# Patient Record
Sex: FEMALE | Race: WHITE | Employment: STUDENT | URBAN - METROPOLITAN AREA
[De-identification: names, ages, dates, MRNs, and addresses within clinical notes are randomized per-mention and may not be internally consistent; named-entity substitution may affect disease eponyms.]

---

## 2018-11-14 PROCEDURE — 86376 MICROSOMAL ANTIBODY EACH: CPT | Performed by: FAMILY MEDICINE

## 2018-11-14 PROCEDURE — 86800 THYROGLOBULIN ANTIBODY: CPT | Performed by: FAMILY MEDICINE

## 2022-04-20 ENCOUNTER — ORDER TRANSCRIPTION (OUTPATIENT)
Dept: ADMINISTRATIVE | Facility: HOSPITAL | Age: 27
End: 2022-04-20

## 2022-04-27 ENCOUNTER — LAB ENCOUNTER (OUTPATIENT)
Dept: LAB | Facility: HOSPITAL | Age: 27
End: 2022-04-27
Attending: INTERNAL MEDICINE
Payer: COMMERCIAL

## 2022-04-27 DIAGNOSIS — Z01.818 PREOP EXAMINATION: ICD-10-CM

## 2022-04-27 LAB — SARS-COV-2 RNA RESP QL NAA+PROBE: NOT DETECTED

## 2022-04-30 ENCOUNTER — HOSPITAL ENCOUNTER (OUTPATIENT)
Dept: GENERAL RADIOLOGY | Facility: HOSPITAL | Age: 27
Discharge: HOME | End: 2022-04-30
Attending: INTERNAL MEDICINE
Payer: COMMERCIAL

## 2022-04-30 DIAGNOSIS — R13.19 ESOPHAGEAL DYSPHAGIA: ICD-10-CM

## 2022-04-30 PROCEDURE — 74221 X-RAY XM ESOPHAGUS 2CNTRST: CPT | Performed by: INTERNAL MEDICINE

## 2022-12-05 ENCOUNTER — HOSPITAL ENCOUNTER (OUTPATIENT)
Facility: HOSPITAL | Age: 27
Setting detail: OBSERVATION
Discharge: HOME | End: 2022-12-07
Attending: EMERGENCY MEDICINE | Admitting: INTERNAL MEDICINE
Payer: COMMERCIAL

## 2022-12-05 ENCOUNTER — APPOINTMENT (OUTPATIENT)
Dept: CT IMAGING | Facility: HOSPITAL | Age: 27
End: 2022-12-05
Attending: INTERNAL MEDICINE
Payer: COMMERCIAL

## 2022-12-05 DIAGNOSIS — R00.0 TACHYCARDIA: Primary | ICD-10-CM

## 2022-12-05 PROBLEM — I47.10 SUSTAINED SVT: Status: ACTIVE | Noted: 2022-12-05

## 2022-12-05 PROBLEM — I47.10 SUSTAINED SVT (HCC): Status: ACTIVE | Noted: 2022-12-05

## 2022-12-05 PROBLEM — I47.1 SUSTAINED SVT (HCC): Status: ACTIVE | Noted: 2022-12-05

## 2022-12-05 LAB
ATRIAL RATE: 136 BPM
ATRIAL RATE: 141 BPM
B-HCG UR QL: NEGATIVE
D DIMER PPP FEU-MCNC: 0.28 UG/ML FEU (ref ?–0.5)
HETEROPH AB SER QL: NEGATIVE
P AXIS: 53 DEGREES
P-R INTERVAL: 104 MS
P-R INTERVAL: 134 MS
PROCALCITONIN SERPL-MCNC: 0.07 NG/ML (ref ?–0.16)
Q-T INTERVAL: 292 MS
Q-T INTERVAL: 360 MS
QRS DURATION: 68 MS
QRS DURATION: 70 MS
QTC CALCULATION (BEZET): 439 MS
QTC CALCULATION (BEZET): 551 MS
R AXIS: 79 DEGREES
R AXIS: 80 DEGREES
SARS-COV-2 RNA RESP QL NAA+PROBE: NOT DETECTED
T AXIS: -55 DEGREES
T AXIS: 22 DEGREES
TSI SER-ACNC: 0.55 MIU/ML (ref 0.36–3.74)
VENTRICULAR RATE: 136 BPM
VENTRICULAR RATE: 141 BPM

## 2022-12-05 PROCEDURE — 84145 PROCALCITONIN (PCT): CPT | Performed by: INTERNAL MEDICINE

## 2022-12-05 PROCEDURE — 85379 FIBRIN DEGRADATION QUANT: CPT | Performed by: EMERGENCY MEDICINE

## 2022-12-05 PROCEDURE — 70450 CT HEAD/BRAIN W/O DYE: CPT | Performed by: INTERNAL MEDICINE

## 2022-12-05 PROCEDURE — 93005 ELECTROCARDIOGRAM TRACING: CPT

## 2022-12-05 PROCEDURE — 99285 EMERGENCY DEPT VISIT HI MDM: CPT

## 2022-12-05 PROCEDURE — 99291 CRITICAL CARE FIRST HOUR: CPT

## 2022-12-05 PROCEDURE — 86403 PARTICLE AGGLUT ANTBDY SCRN: CPT | Performed by: EMERGENCY MEDICINE

## 2022-12-05 PROCEDURE — 93010 ELECTROCARDIOGRAM REPORT: CPT

## 2022-12-05 PROCEDURE — 96361 HYDRATE IV INFUSION ADD-ON: CPT

## 2022-12-05 PROCEDURE — 96374 THER/PROPH/DIAG INJ IV PUSH: CPT

## 2022-12-05 PROCEDURE — 93010 ELECTROCARDIOGRAM REPORT: CPT | Performed by: INTERNAL MEDICINE

## 2022-12-05 PROCEDURE — 81025 URINE PREGNANCY TEST: CPT

## 2022-12-05 PROCEDURE — 96375 TX/PRO/DX INJ NEW DRUG ADDON: CPT

## 2022-12-05 PROCEDURE — 70491 CT SOFT TISSUE NECK W/DYE: CPT | Performed by: INTERNAL MEDICINE

## 2022-12-05 PROCEDURE — 84443 ASSAY THYROID STIM HORMONE: CPT | Performed by: EMERGENCY MEDICINE

## 2022-12-05 RX ORDER — ADENOSINE 3 MG/ML
6 INJECTION, SOLUTION INTRAVENOUS ONCE
Status: COMPLETED | OUTPATIENT
Start: 2022-12-05 | End: 2022-12-05

## 2022-12-05 RX ORDER — ONDANSETRON 2 MG/ML
4 INJECTION INTRAMUSCULAR; INTRAVENOUS EVERY 6 HOURS PRN
Status: DISCONTINUED | OUTPATIENT
Start: 2022-12-05 | End: 2022-12-07

## 2022-12-05 RX ORDER — ADENOSINE 3 MG/ML
INJECTION, SOLUTION INTRAVENOUS
Status: COMPLETED
Start: 2022-12-05 | End: 2022-12-05

## 2022-12-05 RX ORDER — ADENOSINE 3 MG/ML
12 INJECTION INTRAVENOUS ONCE
Status: COMPLETED | OUTPATIENT
Start: 2022-12-05 | End: 2022-12-05

## 2022-12-05 RX ORDER — METOPROLOL TARTRATE 5 MG/5ML
5 INJECTION INTRAVENOUS ONCE
Status: COMPLETED | OUTPATIENT
Start: 2022-12-05 | End: 2022-12-05

## 2022-12-05 RX ORDER — ENOXAPARIN SODIUM 100 MG/ML
40 INJECTION SUBCUTANEOUS DAILY
Status: DISCONTINUED | OUTPATIENT
Start: 2022-12-06 | End: 2022-12-07

## 2022-12-05 RX ORDER — ACETAMINOPHEN 500 MG
500 TABLET ORAL EVERY 4 HOURS PRN
Status: DISCONTINUED | OUTPATIENT
Start: 2022-12-05 | End: 2022-12-07

## 2022-12-05 RX ORDER — SODIUM CHLORIDE 9 MG/ML
INJECTION, SOLUTION INTRAVENOUS CONTINUOUS
Status: ACTIVE | OUTPATIENT
Start: 2022-12-05 | End: 2022-12-05

## 2022-12-05 RX ORDER — SODIUM CHLORIDE 9 MG/ML
INJECTION, SOLUTION INTRAVENOUS CONTINUOUS
Status: DISCONTINUED | OUTPATIENT
Start: 2022-12-05 | End: 2022-12-06

## 2022-12-05 RX ORDER — SODIUM CHLORIDE 9 MG/ML
INJECTION, SOLUTION INTRAVENOUS ONCE
Status: COMPLETED | OUTPATIENT
Start: 2022-12-05 | End: 2022-12-05

## 2022-12-05 RX ORDER — ACETAMINOPHEN 500 MG
1000 TABLET ORAL EVERY 6 HOURS PRN
COMMUNITY

## 2022-12-05 RX ORDER — IOHEXOL 350 MG/ML
50 INJECTION, SOLUTION INTRAVENOUS
Status: COMPLETED | OUTPATIENT
Start: 2022-12-05 | End: 2022-12-05

## 2022-12-05 RX ORDER — PROCHLORPERAZINE EDISYLATE 5 MG/ML
5 INJECTION INTRAMUSCULAR; INTRAVENOUS EVERY 8 HOURS PRN
Status: DISCONTINUED | OUTPATIENT
Start: 2022-12-05 | End: 2022-12-07

## 2022-12-05 NOTE — ED QUICK NOTES
Orders for admission, patient is aware of plan and ready to go upstairs. Any questions, please call ED RN Clare Shields  at extension [phone]. Vaccinated? No   Type of COVID test sent: Rapid PCR, negative. COVID Suspicion level: Low      Titratable drug(s) infusing:  Rate: None. LOC at time of transport: A&Ox4    Other pertinent information: Received adenosine x2, HR did not resolve. 5mg metoprolol given, -120 now. Preg -.     CIWA score=  NIH score=

## 2022-12-05 NOTE — ED INITIAL ASSESSMENT (HPI)
Pt here from [address], going there for palpitations, sorethroat and fever, pt had full workup there including Iv fluids, temp is down but hr still elev

## 2022-12-06 LAB
ANION GAP SERPL CALC-SCNC: 6 MMOL/L (ref 0–18)
BASOPHILS # BLD AUTO: 0.06 X10(3) UL (ref 0–0.2)
BASOPHILS NFR BLD AUTO: 0.5 %
BUN BLD-MCNC: 7 MG/DL (ref 7–18)
CALCIUM BLD-MCNC: 8.8 MG/DL (ref 8.5–10.1)
CHLORIDE SERPL-SCNC: 109 MMOL/L (ref 98–112)
CO2 SERPL-SCNC: 26 MMOL/L (ref 21–32)
CREAT BLD-MCNC: 0.68 MG/DL
EOSINOPHIL # BLD AUTO: 0.1 X10(3) UL (ref 0–0.7)
EOSINOPHIL NFR BLD AUTO: 0.8 %
ERYTHROCYTE [DISTWIDTH] IN BLOOD BY AUTOMATED COUNT: 12.2 %
GFR SERPLBLD BASED ON 1.73 SQ M-ARVRAT: 122 ML/MIN/1.73M2 (ref 60–?)
GLUCOSE BLD-MCNC: 94 MG/DL (ref 70–99)
HCT VFR BLD AUTO: 41.4 %
HGB BLD-MCNC: 13.7 G/DL
IMM GRANULOCYTES # BLD AUTO: 0.05 X10(3) UL (ref 0–1)
IMM GRANULOCYTES NFR BLD: 0.4 %
LYMPHOCYTES # BLD AUTO: 1.21 X10(3) UL (ref 1–4)
LYMPHOCYTES NFR BLD AUTO: 9.5 %
MAGNESIUM SERPL-MCNC: 2.2 MG/DL (ref 1.6–2.6)
MCH RBC QN AUTO: 28.4 PG (ref 26–34)
MCHC RBC AUTO-ENTMCNC: 33.1 G/DL (ref 31–37)
MCV RBC AUTO: 85.9 FL
MONOCYTES # BLD AUTO: 1.09 X10(3) UL (ref 0.1–1)
MONOCYTES NFR BLD AUTO: 8.6 %
NEUTROPHILS # BLD AUTO: 10.19 X10 (3) UL (ref 1.5–7.7)
NEUTROPHILS # BLD AUTO: 10.19 X10(3) UL (ref 1.5–7.7)
NEUTROPHILS NFR BLD AUTO: 80.2 %
OSMOLALITY SERPL CALC.SUM OF ELEC: 290 MOSM/KG (ref 275–295)
PHOSPHATE SERPL-MCNC: 2.2 MG/DL (ref 2.5–4.9)
PLATELET # BLD AUTO: 222 10(3)UL (ref 150–450)
POTASSIUM SERPL-SCNC: 3.7 MMOL/L (ref 3.5–5.1)
RBC # BLD AUTO: 4.82 X10(6)UL
SODIUM SERPL-SCNC: 141 MMOL/L (ref 136–145)
WBC # BLD AUTO: 12.7 X10(3) UL (ref 4–11)

## 2022-12-06 PROCEDURE — 85025 COMPLETE CBC W/AUTO DIFF WBC: CPT | Performed by: INTERNAL MEDICINE

## 2022-12-06 PROCEDURE — 84100 ASSAY OF PHOSPHORUS: CPT | Performed by: INTERNAL MEDICINE

## 2022-12-06 PROCEDURE — 80048 BASIC METABOLIC PNL TOTAL CA: CPT | Performed by: INTERNAL MEDICINE

## 2022-12-06 PROCEDURE — 93010 ELECTROCARDIOGRAM REPORT: CPT | Performed by: INTERNAL MEDICINE

## 2022-12-06 PROCEDURE — 93005 ELECTROCARDIOGRAM TRACING: CPT

## 2022-12-06 PROCEDURE — 83735 ASSAY OF MAGNESIUM: CPT | Performed by: INTERNAL MEDICINE

## 2022-12-06 RX ORDER — BENZOCAINE AND MENTHOL, UNSPECIFIED FORM 15; 2.3 MG/1; MG/1
1 LOZENGE ORAL EVERY 2 HOUR PRN
Qty: 16 LOZENGE | Refills: 1 | Status: SHIPPED | OUTPATIENT
Start: 2022-12-06

## 2022-12-06 RX ORDER — ALBUTEROL SULFATE 90 UG/1
2 AEROSOL, METERED RESPIRATORY (INHALATION) 4 TIMES DAILY
Status: DISCONTINUED | OUTPATIENT
Start: 2022-12-06 | End: 2022-12-06

## 2022-12-06 NOTE — PLAN OF CARE
Pt AOx4   Complains of throat and headache   -tylenol given   - rest encouraged   NSR/ ST(exertion)   - cardiologist started pt on betablocker   RA  IV fluids   -discontinued    EKG    -attempted to get while pt was standing but was unable.   -tele tech able to print strips with heart rate above 130. Strips are in pt's chart  Discharge planning   All of pt's question answered.

## 2022-12-06 NOTE — PLAN OF CARE
NURSING ADMISSION NOTE    Received care @ [phone]  A&Ox4; RA  ST on tele  IV fluids infusing per order  POC discussed w/ patient       Patient admitted via Cart  Oriented to room. Safety precautions initiated. Bed in low position. Call light in reach.

## 2022-12-06 NOTE — PLAN OF CARE
Assumed care at 1. Aox4. RA. VSS. IVF running. C/O headache. x1 Tylenol given. CT of head and neck completed. Safety precautions in place. Bed lowered and locked. Pt resting in bed. Call light in reach. All questions and concerns addressed. Continue with current POC.

## 2022-12-07 VITALS
SYSTOLIC BLOOD PRESSURE: 122 MMHG | BODY MASS INDEX: 25 KG/M2 | DIASTOLIC BLOOD PRESSURE: 86 MMHG | OXYGEN SATURATION: 96 % | WEIGHT: 175 LBS | HEART RATE: 79 BPM | TEMPERATURE: 98 F | RESPIRATION RATE: 18 BRPM

## 2022-12-07 LAB
PHOSPHATE SERPL-MCNC: 2.5 MG/DL (ref 2.5–4.9)
POTASSIUM SERPL-SCNC: 3.9 MMOL/L (ref 3.5–5.1)

## 2022-12-07 PROCEDURE — 84100 ASSAY OF PHOSPHORUS: CPT | Performed by: HOSPITALIST

## 2022-12-07 PROCEDURE — 84132 ASSAY OF SERUM POTASSIUM: CPT | Performed by: HOSPITALIST

## 2022-12-07 NOTE — PLAN OF CARE
Pt Aox4  NSR/ST  RA  Elyte replacement  Discharge planning  -Went over the importance of following up with primary and cardiology. Went over the new medication metoprolol with pt. Cardiologist gave specific instruction: not having coffee or alcohol for 2 weeks, buy pulse ox ans check twice a day if over 130 constantly call doctor office. All of pt's question answered.

## 2022-12-07 NOTE — PROGRESS NOTES
Pt c/o chest pressure/pain, 6/10, started this afternoon, roughly 30 minutes after PO Lopressor (given at 1558). Pt reported during shift change. Dr. Usha Galloway, cardiology, notified, obtained EKG per order. Dr. Usha Galloway to review results. No additional orders at this time. Will continue to monitor for now. Addendum  No changes in EKG per Dr. Usha Galloway. Pt has been having sore throat and coughing for last few days per pt. Will try to manage coughing and sore throat with PRN cough drops. Will continue to monitor.

## 2022-12-07 NOTE — PROGRESS NOTES
Received pt at 2330. Pt is A&O x 4; follows commands. Pt denies CP or SOB. Pt is on room air with O2 saturation at 98, and SR/ST (with activity) on tele. Cardiac diet, thin liquids. Pt is continent of bowel and bladder. Pt denies pain and ambulates ad bella. POC discussed with pt who verbalized understanding. [address] [facility] safety plan in place; bed in low position,, call light and personal items within reach; pt has agreed to call if she needs to get up from bed.

## 2022-12-07 NOTE — DISCHARGE INSTRUCTIONS
Intrstucted to avoid strenuous activity, alcohol and caffeine for the next 10-14 days.  She will purchase a pulse ox an record sitting and standing heart rates and inform us if they are persistently >130-140

## 2022-12-08 LAB
ATRIAL RATE: 112 BPM
ATRIAL RATE: 97 BPM
P AXIS: 40 DEGREES
P AXIS: 56 DEGREES
P-R INTERVAL: 118 MS
P-R INTERVAL: 148 MS
Q-T INTERVAL: 344 MS
Q-T INTERVAL: 358 MS
QRS DURATION: 72 MS
QRS DURATION: 78 MS
QTC CALCULATION (BEZET): 454 MS
QTC CALCULATION (BEZET): 469 MS
R AXIS: 64 DEGREES
R AXIS: 77 DEGREES
T AXIS: -1 DEGREES
T AXIS: 4 DEGREES
VENTRICULAR RATE: 112 BPM
VENTRICULAR RATE: 97 BPM

## 2023-05-24 ENCOUNTER — APPOINTMENT (OUTPATIENT)
Dept: CARDIOLOGY | Age: 28
End: 2023-05-24

## 2023-07-07 ENCOUNTER — APPOINTMENT (OUTPATIENT)
Dept: CARDIOLOGY | Age: 28
End: 2023-07-07

## 2023-09-05 DIAGNOSIS — R06.09 DOE (DYSPNEA ON EXERTION): ICD-10-CM

## 2023-09-05 DIAGNOSIS — R00.0 TACHYCARDIA: ICD-10-CM

## 2023-09-05 DIAGNOSIS — R07.9 CHEST PAIN: Primary | ICD-10-CM

## 2023-09-05 DIAGNOSIS — R00.2 PALPITATIONS: ICD-10-CM

## 2023-10-04 ENCOUNTER — APPOINTMENT (OUTPATIENT)
Dept: CARDIOLOGY | Age: 28
End: 2023-10-04

## 2023-10-10 NOTE — IMAGING NOTE
Spoke with Vinay Garcia RN from [facility] to clarify order; CT heart with contrast only on 10/11/23 no gated study needed at this time

## 2023-10-11 ENCOUNTER — HOSPITAL ENCOUNTER (OUTPATIENT)
Dept: CT IMAGING | Facility: HOSPITAL | Age: 28
Discharge: HOME | End: 2023-10-11
Payer: COMMERCIAL

## 2023-10-11 VITALS — DIASTOLIC BLOOD PRESSURE: 63 MMHG | HEART RATE: 46 BPM | SYSTOLIC BLOOD PRESSURE: 98 MMHG

## 2023-10-11 DIAGNOSIS — R07.9 CHEST PAIN, UNSPECIFIED: ICD-10-CM

## 2023-10-11 DIAGNOSIS — R00.2 PALPITATIONS: ICD-10-CM

## 2023-10-11 DIAGNOSIS — I47.10 SUPRAVENTRICULAR TACHYCARDIA: ICD-10-CM

## 2023-10-11 LAB
CREAT BLD-MCNC: 0.7 MG/DL
EGFRCR SERPLBLD CKD-EPI 2021: 121 ML/MIN/1.73M2 (ref 60–?)

## 2023-10-11 PROCEDURE — 75574 CT ANGIO HRT W/3D IMAGE: CPT | Performed by: INTERNAL MEDICINE

## 2023-10-11 PROCEDURE — 75574 CT ANGIO HRT W/3D IMAGE: CPT

## 2023-10-11 PROCEDURE — 82565 ASSAY OF CREATININE: CPT

## 2023-10-11 NOTE — IMAGING NOTE
Pt scheduled for CT gated pulmonary vein  Denies any allergies to contrast.  Scanned in CT room # 3  HR 63 during contrast bolus  HR 46 during scan at 0842  0.9 NS 50/50 ml given  IV contrast 20/90 ml given with a total of 110 ml. Tolerated exam well. Instructed to drink water.

## 2023-12-16 ENCOUNTER — HOSPITAL ENCOUNTER (OUTPATIENT)
Dept: CT IMAGING | Facility: HOSPITAL | Age: 28
Discharge: HOME | End: 2023-12-16
Attending: INTERNAL MEDICINE
Payer: COMMERCIAL

## 2023-12-16 DIAGNOSIS — I47.10 SVT (SUPRAVENTRICULAR TACHYCARDIA): ICD-10-CM

## 2023-12-16 DIAGNOSIS — R00.0 TACHYCARDIA: ICD-10-CM

## 2023-12-16 PROCEDURE — 71250 CT THORAX DX C-: CPT | Performed by: INTERNAL MEDICINE

## 2024-03-27 ENCOUNTER — LAB ENCOUNTER (OUTPATIENT)
Dept: LAB | Age: 29
End: 2024-03-27
Attending: INTERNAL MEDICINE
Payer: COMMERCIAL

## 2024-03-27 DIAGNOSIS — R00.2 PALPITATIONS: ICD-10-CM

## 2024-03-27 DIAGNOSIS — R07.9 CHEST PAIN: ICD-10-CM

## 2024-03-27 DIAGNOSIS — I47.10 SUPRAVENTRICULAR TACHYCARDIA (HCC): ICD-10-CM

## 2024-03-27 DIAGNOSIS — R00.0 TACHYCARDIA: ICD-10-CM

## 2024-03-27 DIAGNOSIS — R06.09 DOE (DYSPNEA ON EXERTION): Primary | ICD-10-CM

## 2024-03-27 DIAGNOSIS — Z01.818 PRE-OP EXAM: ICD-10-CM

## 2024-03-27 LAB
ANION GAP SERPL CALC-SCNC: 13 MMOL/L (ref 0–18)
BASOPHILS # BLD AUTO: 0.07 X10(3) UL (ref 0–0.2)
BASOPHILS NFR BLD AUTO: 1.5 %
BUN BLD-MCNC: 5 MG/DL (ref 9–23)
CALCIUM BLD-MCNC: 9.9 MG/DL (ref 8.5–10.1)
CHLORIDE SERPL-SCNC: 100 MMOL/L (ref 98–112)
CO2 SERPL-SCNC: 20 MMOL/L (ref 21–32)
CREAT BLD-MCNC: 0.98 MG/DL
EGFRCR SERPLBLD CKD-EPI 2021: 81 ML/MIN/1.73M2 (ref 60–?)
EOSINOPHIL # BLD AUTO: 0.1 X10(3) UL (ref 0–0.7)
EOSINOPHIL NFR BLD AUTO: 2.1 %
ERYTHROCYTE [DISTWIDTH] IN BLOOD BY AUTOMATED COUNT: 12.3 %
FASTING STATUS PATIENT QL REPORTED: YES
GLUCOSE BLD-MCNC: 75 MG/DL (ref 70–99)
HCT VFR BLD AUTO: 46.7 %
HGB BLD-MCNC: 16.2 G/DL
IMM GRANULOCYTES # BLD AUTO: 0.01 X10(3) UL (ref 0–1)
IMM GRANULOCYTES NFR BLD: 0.2 %
LYMPHOCYTES # BLD AUTO: 1.32 X10(3) UL (ref 1–4)
LYMPHOCYTES NFR BLD AUTO: 27.9 %
MCH RBC QN AUTO: 28.2 PG (ref 26–34)
MCHC RBC AUTO-ENTMCNC: 34.7 G/DL (ref 31–37)
MCV RBC AUTO: 81.2 FL
MONOCYTES # BLD AUTO: 0.41 X10(3) UL (ref 0.1–1)
MONOCYTES NFR BLD AUTO: 8.7 %
NEUTROPHILS # BLD AUTO: 2.82 X10 (3) UL (ref 1.5–7.7)
NEUTROPHILS # BLD AUTO: 2.82 X10(3) UL (ref 1.5–7.7)
NEUTROPHILS NFR BLD AUTO: 59.6 %
OSMOLALITY SERPL CALC.SUM OF ELEC: 272 MOSM/KG (ref 275–295)
PLATELET # BLD AUTO: 210 10(3)UL (ref 150–450)
POTASSIUM SERPL-SCNC: 3.4 MMOL/L (ref 3.5–5.1)
RBC # BLD AUTO: 5.75 X10(6)UL
SODIUM SERPL-SCNC: 133 MMOL/L (ref 136–145)
WBC # BLD AUTO: 4.7 X10(3) UL (ref 4–11)

## 2024-03-27 PROCEDURE — 80048 BASIC METABOLIC PNL TOTAL CA: CPT

## 2024-03-27 PROCEDURE — 85025 COMPLETE CBC W/AUTO DIFF WBC: CPT

## 2024-03-27 PROCEDURE — 36415 COLL VENOUS BLD VENIPUNCTURE: CPT

## 2024-03-28 ENCOUNTER — HOSPITAL ENCOUNTER (OUTPATIENT)
Dept: CV DIAGNOSTICS | Facility: HOSPITAL | Age: 29
Discharge: HOME | End: 2024-03-28
Attending: INTERNAL MEDICINE
Payer: COMMERCIAL

## 2024-03-28 PROCEDURE — 93010 ELECTROCARDIOGRAM REPORT: CPT | Performed by: INTERNAL MEDICINE

## 2024-03-28 PROCEDURE — 93005 ELECTROCARDIOGRAM TRACING: CPT

## 2024-03-31 LAB
ATRIAL RATE: 104 BPM
P AXIS: 81 DEGREES
P-R INTERVAL: 126 MS
Q-T INTERVAL: 390 MS
QRS DURATION: 74 MS
QTC CALCULATION (BEZET): 512 MS
R AXIS: 90 DEGREES
T AXIS: -69 DEGREES
VENTRICULAR RATE: 104 BPM

## 2024-04-12 VITALS — BODY MASS INDEX: 21 KG/M2 | WEIGHT: 150 LBS | HEIGHT: 71 IN

## 2024-04-12 NOTE — PAT NURSING NOTE
Per PAT encounter/US [employer] message sent to pt:    PreOp Instructions     You are scheduled for: a Cardiac Procedure     Date of Procedure: 04/17/24 Wednesday     Diet Instructions: Do not eat or drink anything after midnight     Medications: If you need Tylenol the morning of your procedure, you can take a dose with a sip of water.     Other Medications: Continue to hold your Corlanor and Metoprolol doses as instructed by Dr. Isaacs's office.     Skin Prep: Shower with antibacterial soap using a clean washcloth, prior to procedure     Arrival Time: The day prior to your procedure (Tuesday) you will receive a phone call before 6:00 pm with your arrival time. If you haven't received a phone call, please check your voicemail messages., If you did not receive a voice mail and it is after 6:00 pm, please call the nursing supervisor at [phone].    Driving After Procedure: If sedation is given, you WILL NOT be able to drive home. You will need a responsible adult  to drive you home., Cannot take uber or cab unless approved by physician     Discharge Teaching: Your nurse will give you specific instructions before discharge, Most people can resume normal activities in 2-3 days, Any questions, please call the physician's office      parking is available starting at 6 am or park in the [place] parking garage at [facility]. Check in at the [facility] reception desk. Our  will be there to check you in for your procedure. Please bring your insurance cards and ID with you.                                                                                                                                      Please DO NOT respond to this message, the inbasket is not monitored for messages. For any questions, please call the physician's office.

## 2024-04-17 ENCOUNTER — HOSPITAL ENCOUNTER (OUTPATIENT)
Dept: INTERVENTIONAL RADIOLOGY/VASCULAR | Facility: HOSPITAL | Age: 29
Discharge: HOME | End: 2024-04-17
Attending: INTERNAL MEDICINE
Payer: COMMERCIAL

## 2024-06-14 ENCOUNTER — HOSPITAL ENCOUNTER (OUTPATIENT)
Dept: INTERVENTIONAL RADIOLOGY/VASCULAR | Facility: HOSPITAL | Age: 29
Discharge: HOME | End: 2024-06-14
Attending: INTERNAL MEDICINE
Payer: COMMERCIAL

## 2024-09-16 ENCOUNTER — LAB ENCOUNTER (OUTPATIENT)
Dept: LAB | Age: 29
End: 2024-09-16
Attending: INTERNAL MEDICINE
Payer: COMMERCIAL

## 2024-09-16 DIAGNOSIS — I47.10 PAROXYSMAL SUPRAVENTRICULAR TACHYCARDIA (HCC): ICD-10-CM

## 2024-09-16 DIAGNOSIS — R00.2 PALPITATION: Primary | ICD-10-CM

## 2024-09-16 DIAGNOSIS — Z01.818 PRE-OP EXAM: ICD-10-CM

## 2024-09-16 LAB
ANION GAP SERPL CALC-SCNC: 12 MMOL/L (ref 0–18)
BASOPHILS # BLD AUTO: 0.06 X10(3) UL (ref 0–0.2)
BASOPHILS NFR BLD AUTO: 0.9 %
BUN BLD-MCNC: <5 MG/DL (ref 9–23)
CALCIUM BLD-MCNC: 10.2 MG/DL (ref 8.7–10.4)
CHLORIDE SERPL-SCNC: 102 MMOL/L (ref 98–112)
CO2 SERPL-SCNC: 25 MMOL/L (ref 21–32)
CREAT BLD-MCNC: 0.81 MG/DL
EGFRCR SERPLBLD CKD-EPI 2021: 101 ML/MIN/1.73M2 (ref 60–?)
EOSINOPHIL # BLD AUTO: 0.11 X10(3) UL (ref 0–0.7)
EOSINOPHIL NFR BLD AUTO: 1.7 %
ERYTHROCYTE [DISTWIDTH] IN BLOOD BY AUTOMATED COUNT: 12.5 %
FASTING STATUS PATIENT QL REPORTED: YES
GLUCOSE BLD-MCNC: 63 MG/DL (ref 70–99)
HCT VFR BLD AUTO: 43.6 %
HGB BLD-MCNC: 14.9 G/DL
IMM GRANULOCYTES # BLD AUTO: 0.01 X10(3) UL (ref 0–1)
IMM GRANULOCYTES NFR BLD: 0.2 %
LYMPHOCYTES # BLD AUTO: 1.6 X10(3) UL (ref 1–4)
LYMPHOCYTES NFR BLD AUTO: 25.1 %
MCH RBC QN AUTO: 28.2 PG (ref 26–34)
MCHC RBC AUTO-ENTMCNC: 34.2 G/DL (ref 31–37)
MCV RBC AUTO: 82.4 FL
MONOCYTES # BLD AUTO: 0.43 X10(3) UL (ref 0.1–1)
MONOCYTES NFR BLD AUTO: 6.8 %
NEUTROPHILS # BLD AUTO: 4.16 X10 (3) UL (ref 1.5–7.7)
NEUTROPHILS # BLD AUTO: 4.16 X10(3) UL (ref 1.5–7.7)
NEUTROPHILS NFR BLD AUTO: 65.3 %
PLATELET # BLD AUTO: 223 10(3)UL (ref 150–450)
POTASSIUM SERPL-SCNC: 3.6 MMOL/L (ref 3.5–5.1)
RBC # BLD AUTO: 5.29 X10(6)UL
SODIUM SERPL-SCNC: 139 MMOL/L (ref 136–145)
WBC # BLD AUTO: 6.4 X10(3) UL (ref 4–11)

## 2024-09-16 PROCEDURE — 85025 COMPLETE CBC W/AUTO DIFF WBC: CPT

## 2024-09-16 PROCEDURE — 36415 COLL VENOUS BLD VENIPUNCTURE: CPT

## 2024-09-16 PROCEDURE — 80048 BASIC METABOLIC PNL TOTAL CA: CPT

## 2024-09-27 NOTE — PAT NURSING NOTE
PreOp Instructions     You are scheduled for: a Cardiac Procedure     Date of Procedure: 10/07/24     Diet Instructions: DO NOT eat or drink anything after midnight including gum, mints, candy, etc.     Medications to Stop: DO NOT TAKE any herbal supplements and vitamins the morning of your procedure.     Other Medications: DO NOT TAKE Metoprolol and Corlanor for 1 week prior to your procedure. Your last doses will be on Sunday 9/29.     Skin Prep: Shower with antibacterial soap using a clean washcloth, prior to procedure. Once dried off, no lotions/powders/creams/ointments, etc.     Arrival Time: The Friday prior to your procedure (10/4) you will receive a phone call before 6:00 pm with your arrival time. If you haven't received a phone call, please check your voicemail messages., If you did not receive a voice mail and it is after 6:00 pm, please call the nursing supervisor at [phone].    Driving After Procedure: Sedation will be given so you WILL NOT be able to drive home. You will need a responsible adult  to drive you home. You can NOT take uber or taxi unless approved by your physician in advance.     Discharge Teaching: Your nurse will give you specific instructions before discharge, Most people can resume normal activities in 2-3 days, Any questions, please call the physician's office

## 2024-10-07 ENCOUNTER — HOSPITAL ENCOUNTER (OUTPATIENT)
Dept: INTERVENTIONAL RADIOLOGY/VASCULAR | Facility: HOSPITAL | Age: 29
Discharge: HOME | End: 2024-10-07
Attending: INTERNAL MEDICINE | Admitting: INTERNAL MEDICINE
Payer: COMMERCIAL

## 2024-10-07 VITALS
OXYGEN SATURATION: 97 % | HEART RATE: 104 BPM | WEIGHT: 150 LBS | SYSTOLIC BLOOD PRESSURE: 120 MMHG | BODY MASS INDEX: 21 KG/M2 | DIASTOLIC BLOOD PRESSURE: 87 MMHG | HEIGHT: 71 IN | TEMPERATURE: 98 F | RESPIRATION RATE: 16 BRPM

## 2024-10-07 DIAGNOSIS — I47.10 SVT (SUPRAVENTRICULAR TACHYCARDIA) (HCC): ICD-10-CM

## 2024-10-07 LAB — B-HCG UR QL: NEGATIVE

## 2024-10-07 PROCEDURE — 4A0234Z MEASUREMENT OF CARDIAC ELECTRICAL ACTIVITY, PERCUTANEOUS APPROACH: ICD-10-PCS | Performed by: INTERNAL MEDICINE

## 2024-10-07 PROCEDURE — 81025 URINE PREGNANCY TEST: CPT

## 2024-10-07 PROCEDURE — 02583ZZ DESTRUCTION OF CONDUCTION MECHANISM, PERCUTANEOUS APPROACH: ICD-10-PCS | Performed by: INTERNAL MEDICINE

## 2024-10-07 PROCEDURE — 3E083KZ INTRODUCTION OF OTHER DIAGNOSTIC SUBSTANCE INTO HEART, PERCUTANEOUS APPROACH: ICD-10-PCS | Performed by: INTERNAL MEDICINE

## 2024-10-07 PROCEDURE — 02K83ZZ MAP CONDUCTION MECHANISM, PERCUTANEOUS APPROACH: ICD-10-PCS | Performed by: INTERNAL MEDICINE

## 2024-10-07 PROCEDURE — 93653 COMPRE EP EVAL TX SVT: CPT | Performed by: INTERNAL MEDICINE

## 2024-10-07 PROCEDURE — 99152 MOD SED SAME PHYS/QHP 5/>YRS: CPT | Performed by: INTERNAL MEDICINE

## 2024-10-07 PROCEDURE — 4A023FZ MEASUREMENT OF CARDIAC RHYTHM, PERCUTANEOUS APPROACH: ICD-10-PCS | Performed by: INTERNAL MEDICINE

## 2024-10-07 PROCEDURE — 93623 PRGRMD STIMJ&PACG IV RX NFS: CPT | Performed by: INTERNAL MEDICINE

## 2024-10-07 RX ORDER — ISOPROTERENOL HYDROCHLORIDE 0.2 MG/ML
INJECTION, SOLUTION INTRAVENOUS
Status: COMPLETED
Start: 2024-10-07 | End: 2024-10-07

## 2024-10-07 RX ORDER — SODIUM CHLORIDE 9 MG/ML
INJECTION, SOLUTION INTRAVENOUS
Status: COMPLETED | OUTPATIENT
Start: 2024-10-07 | End: 2024-10-07

## 2024-10-07 RX ORDER — HEPARIN SODIUM 5000 [USP'U]/ML
INJECTION, SOLUTION INTRAVENOUS; SUBCUTANEOUS
Status: COMPLETED
Start: 2024-10-07 | End: 2024-10-07

## 2024-10-07 RX ORDER — MIDAZOLAM HYDROCHLORIDE 1 MG/ML
INJECTION INTRAMUSCULAR; INTRAVENOUS
Status: COMPLETED
Start: 2024-10-07 | End: 2024-10-07

## 2024-10-07 RX ORDER — LIDOCAINE HYDROCHLORIDE 10 MG/ML
INJECTION, SOLUTION EPIDURAL; INFILTRATION; INTRACAUDAL; PERINEURAL
Status: COMPLETED
Start: 2024-10-07 | End: 2024-10-07

## 2024-10-07 RX ORDER — HEPARIN SODIUM 1000 [USP'U]/ML
INJECTION, SOLUTION INTRAVENOUS; SUBCUTANEOUS
Status: COMPLETED
Start: 2024-10-07 | End: 2024-10-07

## 2024-10-07 RX ADMIN — SODIUM CHLORIDE: 9 INJECTION, SOLUTION INTRAVENOUS at 11:55:00

## 2024-10-07 NOTE — PROGRESS NOTES
Pt received s/p RFA with Dr. Isaacs. Right femoral venous access site closed with vascade x3. Site intact, no bleeding/oozing noted. Dr. Isaacs at bedside and spoke with pt's mother post procedure. Recovery complete. Discharge instructions given to pt and pt's mother. IV discontinued, pt taken down to [facility] via wheelchair for discharge.

## 2024-10-07 NOTE — H&P
[place]  Pre Procedure History and Physical    Nohelia Mckeon Patient Status:  Outpatient in a Bed    1995 MRN [id number]   Location [facility] INTERVENTIONAL SUITES Attending Christian Isaacs MD   Hosp Day # 0 PCP Apolonia Saxena MD     Consults      History of Present Illness:  Nohelia Mckeon is a a(n) 29 year old female here for EPS possible Ablation      Prior H/P Reviewed with no changes    Verified we will not perform a sinus node modification. We will only ablate a defied arrhythmia.      History:  Past Medical History:    Acne     Past Surgical History:   Procedure Laterality Date    Repr cmpl wnd lid,nos,ear 2.5-7.5 Right 2014    Procedure: SCAR REVISION OF LIP;  Surgeon: Maurice Combs MD;  [place] [facility] [facility] [place] [facility]    Henefer teeth removed       Family History   Problem Relation Age of Onset    Psoriasis Father     Thyroid disease Mother     Breast Cancer Maternal Cousin         Maternal Great aunt.    Ovarian Cancer Maternal Aunt         Pt unsure between - Ovarian or Cervical.      reports that she has never smoked. She has never used smokeless tobacco. She reports that she does not drink alcohol and does not use drugs.    Allergies:  No Known Allergies    Medications:  No current facility-administered medications for this encounter.    OBJECTIVE      Physical Exam:  Physical Exam   Blood pressure 146/85, pulse 108, temperature 98 °F (36.7 °C), temperature source Temporal, resp. rate 20, height 5' 11\" (1.803 m), weight 150 lb (68 kg), last menstrual period 2024, SpO2 100%, not currently breastfeeding.  Temp (24hrs), Av °F (36.7 °C), Min:98 °F (36.7 °C), Max:98 °F (36.7 °C)    Wt Readings from Last 3 Encounters:   24 150 lb (68 kg)   24 150 lb (68 kg)   22 175 lb (79.4 kg)       NAD  PERRLA/EOMI  Neck veins not elevated  Carotids- no bruits  CTA bilaterally  Cardiac- RRR  Abdomen- Soft,Nontender, normal BS  Extremities-  pulses normal  Edema-   Mood /Affect Congruent  Skin- no lesions          Results:   No results for input(s): \"GLU\", \"BUN\", \"CREATSERUM\", \"GFRAA\", \"GFRNAA\", \"EGFRCR\", \"CA\", \"NA\", \"K\", \"CL\", \"CO2\" in the last 168 hours.  No results for input(s): \"RBC\", \"HGB\", \"HCT\", \"MCV\", \"MCH\", \"MCHC\", \"RDW\", \"NEPRELIM\", \"WBC\", \"PLT\" in the last 168 hours.      [unfilled]  No results for input(s): \"BNP\" in the last 168 hours.  No results found for: \"PT\", \"INR\"  No results found for: \"TROP\"      No results found.       Assessment and Plan    Patient Active Problem List   Diagnosis    Family history of thyroid disease    Tachycardia    Sustained SVT (HCC)       Consent: Risks, Benefits and alternatives discussed in clinic. Reverified on the day of the procedure    Procedure Planned: EPS Possible ablation    Sedation Plan: none to mild    Discharge Plan: same day      Christian Isaacs MD  Cardiac Electrophysiology  [facility]  10/7/2024  12:38 PM

## 2024-10-07 NOTE — PROCEDURES
Electrophysiology Procedure Note    Nohelia Mckeon Location: Cath Lab   CSN [id number] MRN [id number]   Admission Date 10/7/2024  Operation Date 10/07/24    Attending Physician Christian Isaacs MD Operating Physician Christian Isaacs MD     Pre-Operative Diagnosis: SVT    Post-Operative Diagnosis: Same as above    Procedure Performed:   1. Comprehensive electrophysiology study.   2. Coronary sinus catheter placement to record left atrial electrograms and pace the left atrium. .    3. Three-dimensional intracardiac mapping.   4. 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 Additional Arrhythmias-none  6. Vascular closure with Vascade  7. IV drug infusion-isoproterenol multiple times up to 5 mcg/min, up to 10 mcg/min as a bolus    Indication: Symptomatic drug refractory SVT    EBL: Minimal    Summary of Case: The patient was brought to the EP lab in a fasting and   nonsedated state after providing informed consent. . The right and left groins were prepped   and draped in a sterile fashion.     Sedation: Versed 2 mg, fentanyl 50 mcg given in bolus doses    Access:  Access was achieved with ultrasound guidance  Left Femoral Vein none  Right Femoral Vein -x 3  7 Upper sorbian-decapolar catheter to the coronary sinus  7 Upper sorbian-deflectable quadripolar catheter to the RV apex  8 Upper sorbian-CRD 2 to the hiss which was then exchanged out for an VISI go with a Q dot ablation catheter for ablation  Electrophysiology Study  Baseline measurements made. Pacing from the HRA/CS/RV/his bundle performed. 3-D mapping used to tag the AV node/His Region    ELECTROPHYSIOLOGY STUDY FINDINGS:    SCL CT QRS QT AH HV AVWB VAWB AVNERP    Baseline 787 127 109 433 69 47 330 300, retrograde was concentric and decremental 450/180    Post Ablation 600 122 105 421 58 42 310          Arrhythmia Induction  Arrhythmia narrow complex tachycardia with short VA time induced with burst pacing  Induction method  patient had long nonsustained runs of a narrow complex tachycardia that had the appearance of typical slow fast AVNRT.  Unfortunately did not sustain long enough despite multiple tries to perform any maneuvers.  Factors in favor of AV fabi reentrant tachycardia was at the at baseline the patient had evidence of dual AV fabi physiology with a jump and an echo.  With isoproterenol she would have double and sometimes triple echoes.  - Termination of the arrhythmia was not diagnostic of 1 arrhythmia versus another.  With wobble and cycling changes there did not seem to be a correlation between atrial cycle length changes preceding ventricular cycle length changes.    -.  His seen pacing did not reveal a septal accessory pathway and had a fabi response  -After discussion with the patient who had not been sedated about whether to empirically do a slow pathway modification we elected to perform a slow pathway modification.  We used 25 W irrigated tip catheter and had junctional throughout the 62nd lesion.  TCL -220 ms  Termination-spontaneous  Maneuvers/Evidence -see above    Ablation  Open irrigated tip catheter 20 to 25 W at the region of the slow pathway.  There was a significant distance between the marking of where the AV fabi's and his signals were and the slow pathway      CONCLUSIONS:   1. Sinus node function normal.   2. Atrioventricular node function normal.   3. His-Purkinje function normal.   4.  Successful ablation of the slow pathway  5.  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  Vascade venous closure  7.  Isoproterenol up to 10 mcg/min      Complications:  None      Plan:    1)Meds: - resume all prior meds  2) Bedrest - 2 hours  3) Follow up - 1 month              Christian Isaacs MD    Cardiac Electrophysiololgy  [facility]  10/7/2024

## (undated) DIAGNOSIS — Z01.818 PREOP EXAMINATION: Primary | ICD-10-CM